# Patient Record
Sex: MALE | Race: WHITE | NOT HISPANIC OR LATINO | URBAN - METROPOLITAN AREA
[De-identification: names, ages, dates, MRNs, and addresses within clinical notes are randomized per-mention and may not be internally consistent; named-entity substitution may affect disease eponyms.]

---

## 2023-12-08 ENCOUNTER — OFFICE VISIT (OUTPATIENT)
Dept: URGENT CARE | Facility: CLINIC | Age: 43
End: 2023-12-08
Payer: COMMERCIAL

## 2023-12-08 VITALS
HEIGHT: 73 IN | BODY MASS INDEX: 25.18 KG/M2 | WEIGHT: 190 LBS | OXYGEN SATURATION: 99 % | SYSTOLIC BLOOD PRESSURE: 129 MMHG | DIASTOLIC BLOOD PRESSURE: 83 MMHG | TEMPERATURE: 98 F | HEART RATE: 63 BPM | RESPIRATION RATE: 16 BRPM

## 2023-12-08 DIAGNOSIS — M54.30 SCIATIC LEG PAIN: Primary | ICD-10-CM

## 2023-12-08 PROCEDURE — 96372 THER/PROPH/DIAG INJ SC/IM: CPT | Mod: S$GLB,,, | Performed by: FAMILY MEDICINE

## 2023-12-08 PROCEDURE — 99203 PR OFFICE/OUTPT VISIT, NEW, LEVL III, 30-44 MIN: ICD-10-PCS | Mod: 25,S$GLB,, | Performed by: PHYSICIAN ASSISTANT

## 2023-12-08 PROCEDURE — 99203 OFFICE O/P NEW LOW 30 MIN: CPT | Mod: 25,S$GLB,, | Performed by: PHYSICIAN ASSISTANT

## 2023-12-08 PROCEDURE — 96372 PR INJECTION,THERAP/PROPH/DIAG2ST, IM OR SUBCUT: ICD-10-PCS | Mod: S$GLB,,, | Performed by: FAMILY MEDICINE

## 2023-12-08 RX ORDER — CITALOPRAM 40 MG/1
40 TABLET, FILM COATED ORAL DAILY
COMMUNITY

## 2023-12-08 RX ORDER — METOPROLOL SUCCINATE 50 MG/1
50 TABLET, EXTENDED RELEASE ORAL
COMMUNITY
Start: 2023-11-11

## 2023-12-08 RX ORDER — KETOROLAC TROMETHAMINE 30 MG/ML
30 INJECTION, SOLUTION INTRAMUSCULAR; INTRAVENOUS
Status: COMPLETED | OUTPATIENT
Start: 2023-12-08 | End: 2023-12-08

## 2023-12-08 RX ADMIN — KETOROLAC TROMETHAMINE 30 MG: 30 INJECTION, SOLUTION INTRAMUSCULAR; INTRAVENOUS at 11:12

## 2023-12-08 NOTE — PROGRESS NOTES
"Subjective:      Patient ID: Donte Ring is a 43 y.o. male.    Vitals:  height is 6' 1" (1.854 m) and weight is 86.2 kg (190 lb). His oral temperature is 98.1 °F (36.7 °C). His blood pressure is 129/83 and his pulse is 63. His respiration is 16 and oxygen saturation is 99%.     Chief Complaint: Hip Pain (Rt side X 3 days pain /)    43 year old male with R buttock pain that radiates down to R side of back & leg. Pt states he's not able to bear much weight on leg due to pain with walking. Pt denies any injury/fall. Pt denies sob or cp.       Hip Pain   The incident occurred 3 to 5 days ago. There was no injury mechanism. The pain is present in the right hip. The quality of the pain is described as aching, shooting and stabbing. The pain is at a severity of 5/10. The pain is moderate. The pain has been Constant since onset. Associated symptoms include an inability to bear weight and muscle weakness. Pertinent negatives include no loss of motion, loss of sensation, numbness or tingling. He reports no foreign bodies present. The symptoms are aggravated by movement, palpation and weight bearing. He has tried NSAIDs and rest for the symptoms. The treatment provided no relief.     Musculoskeletal:  Positive for pain, back pain, pain with walking and muscle ache. Negative for trauma and joint pain.   Neurological:  Negative for numbness.      Objective:     Vitals:    12/08/23 1054   BP: 129/83   Pulse: 63   Resp: 16   Temp: 98.1 °F (36.7 °C)   TempSrc: Oral   SpO2: 99%   Weight: 86.2 kg (190 lb)   Height: 6' 1" (1.854 m)       Physical Exam   Constitutional: He is oriented to person, place, and time. He appears well-developed. He is cooperative.   HENT:   Head: Normocephalic and atraumatic.   Ears:   Right Ear: Hearing, tympanic membrane, external ear and ear canal normal.   Left Ear: Hearing, tympanic membrane, external ear and ear canal normal.   Nose: Nose normal. No mucosal edema or nasal deformity. No epistaxis. " Right sinus exhibits no maxillary sinus tenderness and no frontal sinus tenderness. Left sinus exhibits no maxillary sinus tenderness and no frontal sinus tenderness.   Mouth/Throat: Uvula is midline, oropharynx is clear and moist and mucous membranes are normal. No trismus in the jaw. Normal dentition. No uvula swelling.   Eyes: Conjunctivae and lids are normal.   Neck: Trachea normal and phonation normal. Neck supple.   Cardiovascular: Normal rate, regular rhythm, normal heart sounds and normal pulses.   Pulmonary/Chest: Effort normal and breath sounds normal.   Abdominal: Normal appearance and bowel sounds are normal. Soft.   Musculoskeletal: Normal range of motion.         General: Normal range of motion.        Legs:       Comments: Shooting leg pain consistent with sciatica   Neurological: He is alert and oriented to person, place, and time. He has normal motor skills, normal sensation and intact cranial nerves (2-12). He exhibits normal muscle tone. Gait abnormal.      Comments: Limping due to pain    Skin: Skin is warm, dry and intact.   Psychiatric: His speech is normal and behavior is normal. Judgment and thought content normal.   Nursing note and vitals reviewed.      Assessment:     1. Sciatic leg pain        Plan:       Sciatic leg pain  -     ketorolac injection 30 mg          Medical Decision Making:   Urgent Care Management:  IM TORADOL:   30 mg Toradol injection given in clinic today.  Patient denied any kidney, liver, or GI issues.    Patient monitored for 10-15 minutes afterwards with no immediate complications.      - Educated patient regarding medications for symptomatic relief (outlined below).  - Strict ED precautions given for any emergent symptoms.      I have discussed the diagnosis, treatment plan and recommendations for follow-up with primary care, and patient/guardian verbalized understanding and is agreeable to the plan.   AVS printed and given to patient/guardian upon discharge with  information regarding this visit. All questions were addressed prior to discharge.    Declined needing work note        Patient Instructions   R.I.C.E:    Rest, apply ice intermittently, Do not apply ice directly to skin. Wrap ice in cloth before applying.    Please make sure that you wearing appropriate supportive shoes for lower extremity issues.    OTC Tylenol (acetaminophen) up to 4,000 mg a day is safe for short periods and can be used for pain, and fever. However in high doses and prolonged use it can cause liver irritation.    OTC Ibuprofen (NSAID) is a non-steroidal anti-inflammatory that can be used for pain. However it can also cause stomach irritation if over used.    Of note: Aleve, Motrin, Advil, Mobic, meloxicam, and indomethacin are also other names of NSAIDs.    OTC Voltaren topical cream may be applied for relief, as long as you do not have any allergy to the ingredients.    You will need to follow-up with orthopedics if your symptoms persist, as we discussed.      If you have been discharged from the clinic prior to your point of care test results being completed, please make sure to check your "[employer]hart account.  If there is a change in treatment, we will communicate with you through here.  If your test is positive, and medications are ordered, these will be sent to your preferred pharmacy.   If your test is negative, no further steps needed. If you do not hear from us or have questions, please call the clinic.      - You must understand that you have received an Urgent Care treatment only and that you may be released before all of your medical problems are known or treated.   - You, the patient, will arrange for follow up care as instructed with your primary care provider or recommended specialist.   - If your condition worsens or fails to improve we recommend that you receive another evaluation at the ER immediately or contact your PCP to discuss your concerns, or return here.   - Please do not  drive or make any important decisions for 24 hours if you have received any pain medications, sedatives or mood altering drugs during your visit.    Disclaimer: This document was drafted with the use of a voice recognition device and is likely to have sound alike errors.

## 2023-12-08 NOTE — PATIENT INSTRUCTIONS
R.I.C.E:    Rest, apply ice intermittently, Do not apply ice directly to skin. Wrap ice in cloth before applying.    Please make sure that you wearing appropriate supportive shoes for lower extremity issues.    OTC Tylenol (acetaminophen) up to 4,000 mg a day is safe for short periods and can be used for pain, and fever. However in high doses and prolonged use it can cause liver irritation.    OTC Ibuprofen (NSAID) is a non-steroidal anti-inflammatory that can be used for pain. However it can also cause stomach irritation if over used.    Of note: Aleve, Motrin, Advil, Mobic, meloxicam, and indomethacin are also other names of NSAIDs.    OTC Voltaren topical cream may be applied for relief, as long as you do not have any allergy to the ingredients.    You will need to follow-up with orthopedics if your symptoms persist, as we discussed.      If you have been discharged from the clinic prior to your point of care test results being completed, please make sure to check your [employer] account.  If there is a change in treatment, we will communicate with you through here.  If your test is positive, and medications are ordered, these will be sent to your preferred pharmacy.   If your test is negative, no further steps needed. If you do not hear from us or have questions, please call the clinic.      - You must understand that you have received an Urgent Care treatment only and that you may be released before all of your medical problems are known or treated.   - You, the patient, will arrange for follow up care as instructed with your primary care provider or recommended specialist.   - If your condition worsens or fails to improve we recommend that you receive another evaluation at the ER immediately or contact your PCP to discuss your concerns, or return here.   - Please do not drive or make any important decisions for 24 hours if you have received any pain medications, sedatives or mood altering drugs during your  visit.    Disclaimer: This document was drafted with the use of a voice recognition device and is likely to have sound alike errors.

## 2024-03-12 ENCOUNTER — CLINICAL SUPPORT (OUTPATIENT)
Dept: FAMILY MEDICINE | Facility: CLINIC | Age: 44
End: 2024-03-12

## 2024-03-12 DIAGNOSIS — Z00.00 ROUTINE GENERAL MEDICAL EXAMINATION AT A HEALTH CARE FACILITY: Primary | ICD-10-CM

## 2024-03-12 PROCEDURE — 80305 DRUG TEST PRSMV DIR OPT OBS: CPT | Mod: S$GLB,,, | Performed by: FAMILY MEDICINE

## 2024-03-12 NOTE — PROGRESS NOTES
Donte has presented today on behalf of Ariel Dorman. Donte Ring has completed Non-DOT Drug Screen .    Pallavi Jiménez

## 2024-09-17 ENCOUNTER — OFFICE VISIT (OUTPATIENT)
Dept: URGENT CARE | Facility: CLINIC | Age: 44
End: 2024-09-17
Payer: COMMERCIAL

## 2024-09-17 VITALS
SYSTOLIC BLOOD PRESSURE: 114 MMHG | HEART RATE: 88 BPM | HEIGHT: 73 IN | WEIGHT: 190 LBS | RESPIRATION RATE: 16 BRPM | OXYGEN SATURATION: 98 % | BODY MASS INDEX: 25.18 KG/M2 | TEMPERATURE: 99 F | DIASTOLIC BLOOD PRESSURE: 73 MMHG

## 2024-09-17 DIAGNOSIS — J02.0 STREP THROAT: Primary | ICD-10-CM

## 2024-09-17 DIAGNOSIS — J02.9 SORE THROAT: ICD-10-CM

## 2024-09-17 LAB
CTP QC/QA: YES
CTP QC/QA: YES
MOLECULAR STREP A: POSITIVE
SARS-COV-2 AG RESP QL IA.RAPID: NEGATIVE

## 2024-09-17 PROCEDURE — 87651 STREP A DNA AMP PROBE: CPT | Mod: QW,S$GLB,, | Performed by: FAMILY MEDICINE

## 2024-09-17 PROCEDURE — 87811 SARS-COV-2 COVID19 W/OPTIC: CPT | Mod: QW,S$GLB,, | Performed by: FAMILY MEDICINE

## 2024-09-17 PROCEDURE — 99213 OFFICE O/P EST LOW 20 MIN: CPT | Mod: S$GLB,,, | Performed by: FAMILY MEDICINE

## 2024-09-17 RX ORDER — CLINDAMYCIN HYDROCHLORIDE 300 MG/1
300 CAPSULE ORAL 3 TIMES DAILY
COMMUNITY
Start: 2024-04-11

## 2024-09-17 RX ORDER — KETOROLAC TROMETHAMINE 10 MG/1
10 TABLET, FILM COATED ORAL EVERY 6 HOURS PRN
COMMUNITY
Start: 2024-04-11

## 2024-09-17 RX ORDER — METOPROLOL SUCCINATE 50 MG/1
1 TABLET, EXTENDED RELEASE ORAL EVERY MORNING
COMMUNITY
Start: 2024-06-27 | End: 2025-06-27

## 2024-09-17 RX ORDER — MUPIROCIN 20 MG/G
OINTMENT TOPICAL 3 TIMES DAILY
COMMUNITY
Start: 2024-04-11

## 2024-09-17 RX ORDER — ARIPIPRAZOLE 2 MG/1
2 TABLET ORAL
COMMUNITY

## 2024-09-17 RX ORDER — PENICILLIN V POTASSIUM 500 MG/1
500 TABLET, FILM COATED ORAL EVERY 12 HOURS
Qty: 20 TABLET | Refills: 0 | Status: SHIPPED | OUTPATIENT
Start: 2024-09-17 | End: 2024-09-19 | Stop reason: ALTCHOICE

## 2024-09-17 RX ORDER — CITALOPRAM 40 MG/1
1 TABLET, FILM COATED ORAL EVERY MORNING
COMMUNITY
Start: 2024-08-13 | End: 2025-08-13

## 2024-09-17 RX ORDER — NALTREXONE HYDROCHLORIDE 50 MG/1
2 TABLET, FILM COATED ORAL EVERY MORNING
COMMUNITY
Start: 2024-08-13 | End: 2025-08-13

## 2024-09-17 NOTE — PROGRESS NOTES
"Subjective:      Patient ID: Donte Ring is a 44 y.o. male.    Vitals:  height is 6' 1" (1.854 m) and weight is 86.2 kg (190 lb). His oral temperature is 98.9 °F (37.2 °C). His blood pressure is 114/73 and his pulse is 88. His respiration is 16 and oxygen saturation is 98%.     Chief Complaint: Sore Throat    43 yo male with sick contact with strep throat. Pt states he had fever max 100.3 this morning, sore throat, bodyache x 2days. No congestion or cough. No rash. Took acetaminophen.     Sore Throat   This is a new problem. The current episode started in the past 7 days. The problem has been gradually worsening. The maximum temperature recorded prior to his arrival was 100.4 - 100.9 F. The fever has been present for Less than 1 day. The pain is at a severity of 7/10. The pain is moderate. Associated symptoms include headaches and swollen glands. Pertinent negatives include no congestion or ear pain. He has had exposure to strep. He has tried acetaminophen for the symptoms. The treatment provided no relief.       Constitution: Positive for fatigue and fever. Negative for chills.   HENT:  Positive for sore throat. Negative for ear pain and congestion.    Cardiovascular: Negative.    Eyes: Negative.    Respiratory: Negative.     Gastrointestinal: Negative.    Musculoskeletal:  Negative for muscle ache.   Skin:  Negative for rash.   Neurological:  Positive for headaches.   Psychiatric/Behavioral: Negative.        Objective:     Physical Exam   Constitutional: He is oriented to person, place, and time.   HENT:   Head: Normocephalic.   Ears:   Right Ear: Tympanic membrane, external ear and ear canal normal.   Left Ear: Tympanic membrane, external ear and ear canal normal.   Nose: Nose normal. No purulent discharge or sinus tenderness. No epistaxis.   Mouth/Throat: Mucous membranes are moist. Posterior oropharyngeal erythema present. No oropharyngeal exudate.   Eyes: Conjunctivae are normal. Pupils are equal, round, " and reactive to light.   Neck: Neck supple.   Cardiovascular: Normal rate, regular rhythm, normal heart sounds and normal pulses.   Pulmonary/Chest: Effort normal and breath sounds normal.   Abdominal: Normal appearance. He exhibits no distension. Soft. There is no abdominal tenderness.   Musculoskeletal: Normal range of motion.         General: Normal range of motion.   Lymphadenopathy:     He has cervical adenopathy.   Neurological: no focal deficit. He is alert and oriented to person, place, and time.   Skin: Skin is no rash.   Psychiatric: His behavior is normal. Mood, judgment and thought content normal.   Nursing note and vitals reviewed.      Assessment:     1. Strep throat    2. Sore throat        Plan:       Strep throat    Sore throat  -     SARS Coronavirus 2 Antigen, POCT Manual Read  -     POCT Strep A, Molecular    Other orders  -     penicillin v potassium (VEETID) 500 MG tablet; Take 1 tablet (500 mg total) by mouth every 12 (twelve) hours. for 10 days  Dispense: 20 tablet; Refill: 0      Results for orders placed or performed in visit on 09/17/24   SARS Coronavirus 2 Antigen, POCT Manual Read   Result Value Ref Range    SARS Coronavirus 2 Antigen Negative Negative     Acceptable Yes    POCT Strep A, Molecular   Result Value Ref Range    Molecular Strep A, POC Positive (A) Negative     Acceptable Yes        Review of patient's allergies indicates:  No Known Allergies    SUMMARY:   Strep throat. Contact precautions. Supportive care. Adding antibiotic. He has prescription Ibuprofen and swish and spit anesthetic. Immediate medical provider evaluation if worsening/new symptoms. Handout on Strep Throat given also.          Patient Instructions   Thank you for allowing our team to take care of you today.  Your diagnosis is acute pharyngitis/strep throat.   You are contagious so be careful around those around you.  Rest.  Stay hydrated.  Tylenol/Ibuprofen if needed for soreness  or fever. These can be alternated every 4 hours if needed and if no restriction or allergy.  Throat lozenges/spray as needed.  Immune system boosters like Vitamin C, D, Zinc.  Gargles with warm salt water gargles as needed.  Adding an antibiotic Penicillin VK one pill twice a day for ten days. If any side effect, notify the pharmacy and our office before taking another dose.  Routine followup with your primary care provider.   Immediate medical provider/ER evaluation if any worsening.  Followup here as needed.

## 2024-09-17 NOTE — PATIENT INSTRUCTIONS
Thank you for allowing our team to take care of you today.  Your diagnosis is acute pharyngitis/strep throat.   You are contagious so be careful around those around you.  Rest.  Stay hydrated.  Tylenol/Ibuprofen if needed for soreness or fever. These can be alternated every 4 hours if needed and if no restriction or allergy.  Throat lozenges/spray as needed.  Immune system boosters like Vitamin C, D, Zinc.  Gargles with warm salt water gargles as needed.  Adding an antibiotic Penicillin VK one pill twice a day for ten days. If any side effect, notify the pharmacy and our office before taking another dose.  Routine followup with your primary care provider.   Immediate medical provider/ER evaluation if any worsening.  Followup here as needed.

## 2024-09-19 ENCOUNTER — OFFICE VISIT (OUTPATIENT)
Dept: OTOLARYNGOLOGY | Facility: CLINIC | Age: 44
End: 2024-09-19
Payer: COMMERCIAL

## 2024-09-19 ENCOUNTER — OFFICE VISIT (OUTPATIENT)
Dept: URGENT CARE | Facility: CLINIC | Age: 44
End: 2024-09-19
Payer: COMMERCIAL

## 2024-09-19 VITALS
HEART RATE: 82 BPM | OXYGEN SATURATION: 100 % | TEMPERATURE: 98 F | RESPIRATION RATE: 18 BRPM | SYSTOLIC BLOOD PRESSURE: 122 MMHG | DIASTOLIC BLOOD PRESSURE: 75 MMHG

## 2024-09-19 VITALS — WEIGHT: 189.81 LBS | BODY MASS INDEX: 25.04 KG/M2

## 2024-09-19 DIAGNOSIS — R59.0 CERVICAL ADENOPATHY: Primary | ICD-10-CM

## 2024-09-19 DIAGNOSIS — J35.1 ENLARGED TONSILS: ICD-10-CM

## 2024-09-19 DIAGNOSIS — Z92.89: ICD-10-CM

## 2024-09-19 DIAGNOSIS — J35.1 TONSILLAR HYPERTROPHY: ICD-10-CM

## 2024-09-19 DIAGNOSIS — J35.8 TONSILLAR EXUDATE: ICD-10-CM

## 2024-09-19 DIAGNOSIS — J35.1 ENLARGED TONSILS: Primary | ICD-10-CM

## 2024-09-19 PROCEDURE — 99999 PR PBB SHADOW E&M-EST. PATIENT-LVL III: CPT | Mod: PBBFAC,,, | Performed by: STUDENT IN AN ORGANIZED HEALTH CARE EDUCATION/TRAINING PROGRAM

## 2024-09-19 RX ORDER — CEFTRIAXONE 1 G/1
1 INJECTION, POWDER, FOR SOLUTION INTRAMUSCULAR; INTRAVENOUS ONCE
Status: COMPLETED | OUTPATIENT
Start: 2024-09-19 | End: 2024-09-19

## 2024-09-19 RX ORDER — AMOXICILLIN AND CLAVULANATE POTASSIUM 875; 125 MG/1; MG/1
1 TABLET, FILM COATED ORAL EVERY 12 HOURS
Qty: 28 TABLET | Refills: 0 | Status: SHIPPED | OUTPATIENT
Start: 2024-09-20 | End: 2024-10-04

## 2024-09-19 RX ORDER — CELECOXIB 200 MG/1
200 CAPSULE ORAL 2 TIMES DAILY
Qty: 14 CAPSULE | Refills: 0 | Status: SHIPPED | OUTPATIENT
Start: 2024-09-19 | End: 2024-09-26

## 2024-09-19 RX ORDER — TRAMADOL HYDROCHLORIDE 50 MG/1
50 TABLET ORAL EVERY 6 HOURS
Qty: 10 TABLET | Refills: 0 | Status: SHIPPED | OUTPATIENT
Start: 2024-09-19 | End: 2024-09-19 | Stop reason: ALTCHOICE

## 2024-09-19 RX ORDER — METHYLPREDNISOLONE 4 MG/1
TABLET ORAL
Qty: 21 TABLET | Refills: 0 | Status: SHIPPED | OUTPATIENT
Start: 2024-09-19

## 2024-09-19 RX ORDER — LIDOCAINE HYDROCHLORIDE 10 MG/ML
2.1 INJECTION, SOLUTION INFILTRATION; PERINEURAL
Status: COMPLETED | OUTPATIENT
Start: 2024-09-19 | End: 2024-09-19

## 2024-09-19 RX ADMIN — CEFTRIAXONE 1 G: 1 INJECTION, POWDER, FOR SOLUTION INTRAMUSCULAR; INTRAVENOUS at 10:09

## 2024-09-19 RX ADMIN — LIDOCAINE HYDROCHLORIDE 2.1 ML: 10 INJECTION, SOLUTION INFILTRATION; PERINEURAL at 10:09

## 2024-09-19 NOTE — PATIENT INSTRUCTIONS
- ENT APPT TODAY AT 11:45AM [facility] FOR REGULAR STREP (NOT RESPONDING OF PENICILLIN V) VERSUS TONSILLAR ABSCESS  - PENICILLIN V WILL BE REPLACED WITH AUGMENTIN 875 B.I.D. 14 DAYS      - STREP THROAT POSITIVE:    -Take all of your antibiotics as directed.  -Drink plenty fluids  -You will need to purchase a new toothbrush     You may gargle with hot salt water 4 times a day for the next 2 days and then you may also gargle diluted hydrogen peroxide once to twice daily to alleviate some of your throat discomfort.  Drink plenty of fluids, recommend warm tea with honey.     YOU MAY USE OVER-THE-COUNTER CEPACOL FOR SOOTHING OF YOUR THROAT.  You may wish to avoid spicy food, citrus fruits, and red sauces- as this may irritate the throat more.    You can also take a daily anti-histamine such as Zyrtec, Claritin, Xyzal, OR Allegra-IN DAYTIME; NON DROWSY) AND/OR Benadryl- AT NIGHT; DROWSY) to help with runny nose/sneezing/sore throat/cough.    You may alternate over-the-counter Tylenol and ibuprofen as needed for fever and pain, as long as you do not have any contraindications to these medications.    -If your symptoms worsen, you will need to follow-up with primary care or go to the Emergency Department      If you have been discharged from the clinic prior to your point of care test results being completed, please make sure to check your [employer] account.  If there is a change in treatment, we will communicate with you through here.  If your test is positive, and medications are ordered, these will be sent to your preferred pharmacy.   If your test is negative, no further steps needed. If you do not hear from us or have questions, please call the clinic.      - You must understand that you have received an Urgent Care treatment only and that you may be released before all of your medical problems are known or treated.   - You, the patient, will arrange for follow up care as instructed with your primary care provider or  recommended specialist.   - If your condition worsens or fails to improve we recommend that you receive another evaluation at the ER immediately or contact your PCP to discuss your concerns, or return here.   - Please do not drive or make any important decisions for 24 hours if you have received any pain medications, sedatives or mood altering drugs during your visit.    Disclaimer: This document was drafted with the use of a voice recognition device and is likely to have sound alike errors.

## 2024-09-19 NOTE — PROGRESS NOTES
Chief complaint:    Chief Complaint   Patient presents with    abcess      Pt states that he has been having recurrent strep and he just left the urgent care and they told him that he has an abscess that he may need cut out and half of his throat is closed since Monday pt also states that he had fever on Monday and Tuesday pt states that he doesn't have any tonsils           Referring Provider:  [facility]  [address]  [address]  [place] [address]      History of present illness:     Mr. Ring is a 44 y.o. presenting for sore throat, neck mass    4 days of severe left sore throat, odynophagia, swollen nodes in left neck    He is a smoker    The patient denies neck mass, odynophagia, dysphagia, otalgia, unusual bleeding, or unintentional weight loss before these last 4 days.      History      Past Medical History:   Past Medical History:   Diagnosis Date    Afib     Depression          Past Surgical History:  Past Surgical History:   Procedure Laterality Date    TONSILLECTOMY      TYMPANOSTOMY TUBE PLACEMENT           Medications: Medication list reviewed. He  has a current medication list which includes the following prescription(s): [START ON 9/20/2024] amoxicillin-clavulanate 875-125mg, aripiprazole, aspirin, citalopram, citalopram, clindamycin, ketorolac, metoprolol succinate, metoprolol succinate, mupirocin, naltrexone, methylprednisolone, and tramadol.     Allergies: Review of patient's allergies indicates:  No Known Allergies      Family history: family history is not on file.         Social History          Alcohol use:  reports current alcohol use.            Tobacco:  reports that he has been smoking cigarettes. He has never been exposed to tobacco smoke. He has never used smokeless tobacco.         Physical Examination      Vitals: Weight 86.1 kg (189 lb 13.1 oz).      General: Well developed, well nourished, well hydrated.     Voice: no dysphonia, no dysarthria      Head/Face:  Normocephalic, atraumatic. No scars or lesions. Facial musculature equal.     Eyes: No scleral icterus or conjunctival hemorrhage. EOMI. PERRLA.     Ears:     Right ear: No gross deformity. EAC is clear of debris and erythema. TM are intact with a pneumatized middle ear. No signs of retraction, fluid or infection.      Left ear: No gross deformity. EAC is clear of debris and erythema. TM are intact with a pneumatized middle ear. No signs of retraction, fluid or infection.      Nose: No gross deformity or lesions. No purulent discharge. No significant NSD.      Mouth/Oropharynx: Lips without any lesions. No mucosal lesions within the oropharynx. Pharyngeal walls symmetrical. Uvula midline. Tongue midline without lesions. Tonsils are 3+ on the left with exudates, 2+ on the right and normal    Neck: Trachea midline. Firm, very enlarged left level II/III adenopathy    Lymphatic: No lymphadenopathy in the neck.     Extremities: No cyanosis. Warm and well-perfused.     Skin: No scars or lesions on face or neck.      Neurologic: Moving all extremities without gross abnormality.CN II-XII grossly intact. House-Brackmann 1/6. No signs of nystagmus.          Data reviewed      Review of records:      I reviewed records from the referring provider's office visits including the history, workup, and/or treatment of this problem thus far.    Laboratory:    Strep +      Procedures:  Procedure -Transnasal fiberoptic laryngoscopy     Surgeon: Guanako Paiz M.D. .      Anesthesia: topical 0.05% oxymetazoline with 4% lidocaine      Complications: None.     Description of Procedure: With the patient in the sitting position, topical lidocaine and oxymetazoline was applied to the nose. The scope was passed through the nose. Examination was carried out of the nose, nasopharynx, oropharynx, hypopharynx, and larynx with findings as noted below. Scope was removed. The patient tolerated the procedure well.      Findings: No masses or lesions  in the nose, nasopharynx, oropharynx, hypopharynx, or larynx. Left tonsil enlarged, erythematous, exudates, mild exudates on left BOT as well. No airway edema or masses. Vocal fold abduction and adduction is normal. No pooling of secretions in the piriform sinuses, penetration, or aspiration.         Assessment/Plan:    1. Cervical adenopathy    2. Tonsillar hypertrophy    3. Enlarged tonsils         Donte has acute left strep pharynigits and asymmetric tonsillar enlargement on the left without abscess. He also has significant left cervical adenopathy, more firm and enlarged that would typically expect for strep. Due to unilateral tonsil hypertrophy, significant adenopathy there is concern for underlying carcinoma.    We will treat active infection first - augmentin, medrol dose pack, celebrex for pain, and then f/u in 2 weeks with CT scan prior.    He will return to ED sooner if symptoms worsen - trouble breathing, fever, etc.          Guanako Paiz MD  [facility]   [facility] - [address]  [place] [id number]  P: [phone]  F: [phone]

## 2024-09-19 NOTE — PROGRESS NOTES
Subjective:      Patient ID: Donte iRng is a 44 y.o. male.    Vitals:  oral temperature is 97.7 °F (36.5 °C). His blood pressure is 122/75 and his pulse is 82. His respiration is 18 and oxygen saturation is 100%.     Chief Complaint: Sore Throat    C/o sore throat x 3 days  rates pain 8/10  pt was seen in urgent on 09/17/2024 for same complain and tested positive for strep  pt was prescribed penicillin and has also taken OTC meds with no relief from meds     Sore Throat   This is a recurrent problem. The current episode started in the past 7 days. The problem has been unchanged. The pain is worse on the left side. There has been no fever. The pain is at a severity of 8/10. The pain is moderate. Associated symptoms include trouble swallowing. Pertinent negatives include no abdominal pain, congestion, coughing, diarrhea, drooling, ear discharge, ear pain, headaches, hoarse voice, plugged ear sensation, neck pain, shortness of breath, stridor, swollen glands or vomiting. Exposure to: Teseted positive for strep. He has tried acetaminophen for the symptoms. The treatment provided no relief.       Constitution: Negative for fever.   HENT:  Positive for sore throat and trouble swallowing. Negative for ear pain, ear discharge, drooling and congestion.    Neck: Negative for neck pain.   Respiratory:  Negative for cough, shortness of breath and stridor.    Gastrointestinal:  Negative for abdominal pain, vomiting and diarrhea.   Neurological:  Negative for headaches.      Objective:     Vitals:    09/19/24 1010   BP: 122/75   BP Location: Right arm   Patient Position: Sitting   BP Method: Medium (Automatic)   Pulse: 82   Resp: 18   Temp: 97.7 °F (36.5 °C)   TempSrc: Oral   SpO2: 100%       Physical Exam   Constitutional: He is oriented to person, place, and time. He appears well-developed. He is cooperative.   HENT:   Head: Normocephalic and atraumatic.   Ears:   Right Ear: Hearing, tympanic membrane, external ear and  ear canal normal.   Left Ear: Hearing, tympanic membrane, external ear and ear canal normal.   Nose: Nose normal. No mucosal edema or nasal deformity. No epistaxis. Right sinus exhibits no maxillary sinus tenderness and no frontal sinus tenderness. Left sinus exhibits no maxillary sinus tenderness and no frontal sinus tenderness.   Mouth/Throat: Uvula is midline and mucous membranes are normal. No trismus in the jaw. Normal dentition. No uvula swelling. Oropharyngeal exudate, posterior oropharyngeal edema and posterior oropharyngeal erythema present. Tonsillar abscesses: SENDING TO ENT FOR SECOND OPINION.Tonsils are 2+ on the right. Tonsils are 3+ on the left. Tonsillar exudate.       Eyes: Conjunctivae and lids are normal.   Neck: Trachea normal. Neck supple. No abnormal secretions are present.       Cardiovascular: Normal rate, regular rhythm, normal heart sounds and normal pulses.   Pulmonary/Chest: Effort normal and breath sounds normal.   Abdominal: Normal appearance and bowel sounds are normal. Soft.   Musculoskeletal: Normal range of motion.         General: Normal range of motion.   Lymphadenopathy:     He has cervical adenopathy.   Neurological: He is alert and oriented to person, place, and time. He exhibits normal muscle tone.   Skin: Skin is warm, dry and intact.   Psychiatric: His speech is normal and behavior is normal. Judgment and thought content normal.   Nursing note and vitals reviewed.      Assessment:     1. Enlarged tonsils    2. Tonsillar exudate    3. History of rapid strep test      Results for orders placed or performed in visit on 09/17/24   SARS Coronavirus 2 Antigen, POCT Manual Read   Result Value Ref Range    SARS Coronavirus 2 Antigen Negative Negative     Acceptable Yes    POCT Strep A, Molecular   Result Value Ref Range    Molecular Strep A, POC Positive (A) Negative     Acceptable Yes        Plan:       Enlarged tonsils  -     Ambulatory  referral/consult to ENT  -     amoxicillin-clavulanate 875-125mg (AUGMENTIN) 875-125 mg per tablet; Take 1 tablet by mouth every 12 (twelve) hours. for 14 days  Dispense: 28 tablet; Refill: 0    Tonsillar exudate    History of rapid strep test  -     cefTRIAXone injection 1 g  -     LIDOcaine HCL 10 mg/ml (1%) injection 2.1 mL  -     amoxicillin-clavulanate 875-125mg (AUGMENTIN) 875-125 mg per tablet; Take 1 tablet by mouth every 12 (twelve) hours. for 14 days  Dispense: 28 tablet; Refill: 0          Medical Decision Making:   History:   Old Records Summarized: records from clinic visits.       <> Summary of Records: PLACED ON PENICILLIN V 3 DAYS AGO FOR STREP  Initial Assessment:   VITAL SIGNS STABLE   NO FEVER   NO TRIPODING OR STRIDOR   NO RESPIRATORY DISTRESS   THROAT ABNORMAL ON EXAM  PATIENT REPORTS WORSENING SINCE STARTING PENICILLIN V.  Differential Diagnosis:   STREP TONSILLITIS VERSUS TONSILLAR ABSCESS  Clinical Tests:   Lab Tests: Reviewed         Patient Instructions   - ENT APPT TODAY AT 11:45AM [facility] FOR REGULAR STREP (NOT RESPONDING OF PENICILLIN V) VERSUS TONSILLAR ABSCESS  - PENICILLIN V WILL BE REPLACED WITH AUGMENTIN 875 B.I.D. 14 DAYS      - STREP THROAT POSITIVE:    -Take all of your antibiotics as directed.  -Drink plenty fluids  -You will need to purchase a new toothbrush     You may gargle with hot salt water 4 times a day for the next 2 days and then you may also gargle diluted hydrogen peroxide once to twice daily to alleviate some of your throat discomfort.  Drink plenty of fluids, recommend warm tea with honey.     YOU MAY USE OVER-THE-COUNTER CEPACOL FOR SOOTHING OF YOUR THROAT.  You may wish to avoid spicy food, citrus fruits, and red sauces- as this may irritate the throat more.    You can also take a daily anti-histamine such as Zyrtec, Claritin, Xyzal, OR Allegra-IN DAYTIME; NON DROWSY) AND/OR Benadryl- AT NIGHT; DROWSY) to help with runny nose/sneezing/sore throat/cough.    You may  alternate over-the-counter Tylenol and ibuprofen as needed for fever and pain, as long as you do not have any contraindications to these medications.    -If your symptoms worsen, you will need to follow-up with primary care or go to the Emergency Department      If you have been discharged from the clinic prior to your point of care test results being completed, please make sure to check your [facility] account.  If there is a change in treatment, we will communicate with you through here.  If your test is positive, and medications are ordered, these will be sent to your preferred pharmacy.   If your test is negative, no further steps needed. If you do not hear from us or have questions, please call the clinic.      - You must understand that you have received an Urgent Care treatment only and that you may be released before all of your medical problems are known or treated.   - You, the patient, will arrange for follow up care as instructed with your primary care provider or recommended specialist.   - If your condition worsens or fails to improve we recommend that you receive another evaluation at the ER immediately or contact your PCP to discuss your concerns, or return here.   - Please do not drive or make any important decisions for 24 hours if you have received any pain medications, sedatives or mood altering drugs during your visit.    Disclaimer: This document was drafted with the use of a voice recognition device and is likely to have sound alike errors.